# Patient Record
Sex: MALE | Race: WHITE
[De-identification: names, ages, dates, MRNs, and addresses within clinical notes are randomized per-mention and may not be internally consistent; named-entity substitution may affect disease eponyms.]

---

## 2020-02-12 ENCOUNTER — HOSPITAL ENCOUNTER (EMERGENCY)
Dept: HOSPITAL 41 - JD.ED | Age: 65
Discharge: HOME | End: 2020-02-12
Payer: COMMERCIAL

## 2020-02-12 DIAGNOSIS — Z96.653: ICD-10-CM

## 2020-02-12 DIAGNOSIS — R42: Primary | ICD-10-CM

## 2020-02-12 DIAGNOSIS — Z88.5: ICD-10-CM

## 2020-02-12 DIAGNOSIS — Z88.8: ICD-10-CM
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
## 2020-02-12 DIAGNOSIS — Z79.82: ICD-10-CM

## 2020-02-12 DIAGNOSIS — M25.562: ICD-10-CM

## 2020-02-12 PROCEDURE — 36415 COLL VENOUS BLD VENIPUNCTURE: CPT

## 2020-02-12 PROCEDURE — 85025 COMPLETE CBC W/AUTO DIFF WBC: CPT

## 2020-02-12 PROCEDURE — 99284 EMERGENCY DEPT VISIT MOD MDM: CPT

## 2020-02-12 PROCEDURE — 73564 X-RAY EXAM KNEE 4 OR MORE: CPT

## 2020-02-12 PROCEDURE — 83735 ASSAY OF MAGNESIUM: CPT

## 2020-02-12 PROCEDURE — 80053 COMPREHEN METABOLIC PANEL: CPT

## 2020-02-12 NOTE — CR
Left knee: Four views of the left knee were obtained.

 

Comparison: Prior left knee exam of 11/11/11.

 

Knee prosthesis is seen.  Components are aligned.  Underlying bony 

structures are intact.  No acute fracture or dislocation is seen.  

Joint effusion is noted.

 

Impression:

1.  Left knee prosthesis.

2.  No acute fracture is appreciated.

3.  Joint effusion within the knee.

 

Diagnostic code #3

 

This report was dictated in [place] Standard Time

## 2020-02-12 NOTE — EDM.PDOC
<Smitha Vargas - Last Filed: 02/12/20 09:39>





ED HPI GENERAL MEDICAL PROBLEM





- General


Chief Complaint: Neuro Symptoms/Deficits


Stated Complaint: DIZZY AND LT LEG PAIN


Time Seen by Provider: 02/12/20 09:14


Source of Information: Reports: Patient, Significant Other


History Limitations: Reports: No Limitations





- History of Present Illness


INITIAL COMMENTS - FREE TEXT/NARRATIVE: 





Patient is a pleasant 64-year-old male with a history of chronic pain and a 

pain pump implanted on his right side presents to the ED today for dizziness 

that has been present for the past five weeks. He states he feels like he is 

spinning and the room is still. This has caused him to be nauseous at times to 

the point where he has almost vomited. 





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 

hernia repairs.  His wife reports that he goes in to get the pump refilled 

every three months and the dose is increased by 7% each time she believes. It 

was last refilled January 2nd, 2020. Roughly four days after it was filled the 

patient developed dizziness.  He went to the [facility] to be evaluated 

for the dizziness and left hand and facial numbness.  The [facility] 

told him to take Dramamine and go to his PCP.  The Dramamine did not help the 

dizziness.  The next day he got in to see Dr. Mendes, his PCP, who worked him 

up for a stroke, including a CT scan of his head, a carotid US, and an 

echocardiogram, which were all negative.  Dr. Mendes diagnosed him with 

Benign Paroxysmal Positional Vertigo and sent him to PT for evaluation and 

treatment.  He has seen PT a couple times and has had the Epley maneuvers done 

with no relief from the dizziness.  He has also gone to his chiropractor for 

manipulations of his neck with no relief from the dizziness either.  His wife 

recently started giving him Benadryl per Dr. Mendes recommendation and this 

has not helped either.  





He also reports left knee pain that started after he fell about two months ago.

  He has bilateral knee replacements.  He is worried that he may have damaged 

his left artificial knee when he fell.  He cannot remember how he fell or what 

part of his body he hit.  The pain increases with weight-bearing activities.  

He also reports he has headaches intermittently throughout the past month.  He 

has a headache currently and rates it a 5/10 located on the top of his head. 


Duration: Week(s):, Constant


  ** Left Knee


Pain Score (Numeric/FACES): 8





- Related Data


 Allergies











Allergy/AdvReac Type Severity Reaction Status Date / Time


 


carbamazepine [From Tegretol] Allergy  Liver Verified 02/12/20 08:46





   Problems  


 


morphine Allergy  Itching Verified 02/12/20 08:46


 


oxycodone Allergy  Itching Verified 02/12/20 08:46











Home Meds: 


 Home Meds





Aspirin [Halfprin] 81 mg PO Q48H 02/12/20 [History]


Carbidopa/Levodopa [Carbidopa-Levo  MG ODT] 2 tab PO BEDTIME 02/12/20 [

History]


Cyclobenzaprine [Flexeril] 10 mg PO BID 02/12/20 [History]


Diclofenac Sodium [Voltaren 1% Gel] 2 gram TOP QID 02/12/20 [History]


FLUoxetine HCl [Fluoxetine HCl] 60 mg PO DAILY 02/12/20 [History]


Fish Oil/Omega-3 Fatty Acids [Fish Oil 1,000 MG] 1,000 mg PO DAILY 02/12/20 [

History]


Gabapentin [Neurontin] 600 mg PO TID 02/12/20 [History]


HYDROmorphone [Dilaudid] 4 mg PO Q4H PRN 02/12/20 [History]


Multivitamin [Multivitamins] 1 each PO DAILY 02/12/20 [History]


Pravastatin [Pravachol] 40 mg PO DAILY 02/12/20 [History]


Tamsulosin [Flomax] 0.4 mg PO DAILY 02/12/20 [History]











Past Medical History





- Past Surgical History


GI Surgical History: Reports: Hernia, Abdominal


Musculoskeletal Surgical History: Reports: Knee Replacement





Social & Family History





- Tobacco Use


Smoking Status *Q: Never Smoker





- Caffeine Use


Caffeine Use: Reports: Coffee





- Recreational Drug Use


Recreational Drug Use: No





ED ROS GENERAL





- Review of Systems


Review Of Systems: See Below


Constitutional: Reports: No Symptoms.  Denies: Fever, Chills, Weakness, 

Decreased Appetite


HEENT: Reports: Vertigo.  Denies: Ear Pain, Eye Pain, Throat Pain


Respiratory: Reports: No Symptoms.  Denies: Shortness of Breath, Wheezing, Cough


Cardiovascular: Reports: No Symptoms.  Denies: Chest Pain, Edema, 

Lightheadedness, Syncope


GI/Abdominal: Reports: No Symptoms.  Denies: Abdominal Pain, Diarrhea, Nausea, 

Vomiting


: Reports: No Symptoms.  Denies: Dysuria


Musculoskeletal: Reports: Joint Pain (left knee), Joint Swelling (left knee).  

Denies: Neck Pain, Back Pain


Skin: Reports: No Symptoms.  Denies: Rash, Erythema


Neurological: Reports: Dizziness, Headache.  Denies: Numbness, Syncope, Tingling

, Weakness


Psychiatric: Reports: No Symptoms





ED EXAM, NEURO





- Physical Exam


Exam: See Below


Exam Limited By: No Limitations


General Appearance: Alert, WD/WN, No Apparent Distress


Eye Exam: Bilateral Eye: Nystagmus (with lateral eye movements), PERRL


Ears: Normal External Exam, Normal Canal, Hearing Grossly Normal, Normal TMs


Nose: Normal Inspection, Normal Mucosa, No Blood


Throat/Mouth: Normal Inspection, Normal Lips, Normal Teeth, Normal Gums, Normal 

Oropharynx, Normal Voice, No Airway Compromise


Head Exam: Atraumatic, Normocephalic


Neck: Normal Inspection, Supple, Non-Tender, Full Range of Motion


Respiratory/Chest: No Respiratory Distress, Lungs Clear, Normal Breath Sounds, 

No Accessory Muscle Use, Chest Non-Tender


Cardiovascular: Normal Peripheral Pulses, Regular Rate, Rhythm, No Edema, No 

Gallop, No Murmur


GI/Abdominal: Normal Bowel Sounds, Soft, No Organomegaly, No Distention, No Mass

, Tender (left quadrants from multiple hernia repairs and nerve entrapment in 

mesh)


Neurological: Alert, Normal Mood/Affect, Normal Dorsiflexion, CN II-XII Intact, 

Normal Plantar Flexion, No Motor/Sensory Deficits, Oriented x 3


Back Exam: Normal Inspection, Full Range of Motion (for patient's ability)


Extremities: No Pedal Edema, Normal Capillary Refill, Joint Swelling (mild left 

knee), Limited Range of Motion (left knee due to pain)


Psychiatric: Normal Affect, Normal Mood


Skin Exam: Warm, Dry, Intact, Normal Color, No Rash





Course





- Vital Signs


Last Recorded V/S: 





 Last Vital Signs











Temp  96.7 F L  02/12/20 08:41


 


Pulse  79   02/12/20 08:41


 


Resp  10 L  02/12/20 08:41


 


BP  123/78   02/12/20 08:41


 


Pulse Ox  78 L  02/12/20 11:26














- Orders/Labs/Meds


Orders: 





 Active Orders 24 hr











 Category Date Time Status


 


 Cardiac Monitoring [RC] .AS DIRECTED Care  02/12/20 09:53 Active


 


 Oxygen Therapy [RC] ASDIRECTED Care  02/12/20 11:26 Active











Labs: 





 Laboratory Tests











  02/12/20 02/12/20 Range/Units





  10:35 10:35 


 


WBC  5.27   (4.23-9.07)  K/mm3


 


RBC  5.33   (4.63-6.08)  M/mm3


 


Hgb  13.4 L   (13.7-17.5)  gm/dl


 


Hct  42.5   (40.1-51.0)  %


 


MCV  79.7   (79.0-92.2)  fl


 


MCH  25.1 L   (25.7-32.2)  pg


 


MCHC  31.5 L   (32.2-35.5)  g/dl


 


RDW Std Deviation  66.2 H   (35.1-43.9)  fL


 


Plt Count  187   (163-337)  K/mm3


 


MPV  9.0 L   (9.4-12.3)  fl


 


Neut % (Auto)  65.8   (34.0-67.9)  %


 


Lymph % (Auto)  22.8   (21.8-53.1)  %


 


Mono % (Auto)  7.4   (5.3-12.2)  %


 


Eos % (Auto)  3.4   (0.8-7.0)  


 


Baso % (Auto)  0.6   (0.1-1.2)  %


 


Neut # (Auto)  3.47   (1.78-5.38)  K/mm3


 


Lymph # (Auto)  1.20 L   (1.32-3.57)  K/mm3


 


Mono # (Auto)  0.39   (0.30-0.82)  K/mm3


 


Eos # (Auto)  0.18   (0.04-0.54)  K/mm3


 


Baso # (Auto)  0.03   (0.01-0.08)  K/mm3


 


Sodium   140  (136-145)  mEq/L


 


Potassium   4.5  (3.5-5.1)  mEq/L


 


Chloride   103  ()  mEq/L


 


Carbon Dioxide   30  (21-32)  mEq/L


 


Anion Gap   11.5  (5-15)  


 


BUN   16  (7-18)  mg/dL


 


Creatinine   0.7  (0.7-1.3)  mg/dL


 


Est Cr Clr Drug Dosing   94.47  mL/min


 


Estimated GFR (MDRD)   > 60  (>60)  mL/min


 


BUN/Creatinine Ratio   22.9 H  (14-18)  


 


Glucose   99  ()  mg/dL


 


Calcium   9.0  (8.5-10.1)  mg/dL


 


Magnesium   1.8  (1.8-2.4)  mg/dl


 


Total Bilirubin   0.7  (0.2-1.0)  mg/dL


 


AST   20  (15-37)  U/L


 


ALT   29  (16-63)  U/L


 


Alkaline Phosphatase   112  ()  U/L


 


Total Protein   6.6  (6.4-8.2)  g/dl


 


Albumin   3.5  (3.4-5.0)  g/dl


 


Globulin   3.1  gm/dL


 


Albumin/Globulin Ratio   1.1  (1-2)  











Meds: 





Medications














Discontinued Medications














Generic Name Dose Route Start Last Admin





  Trade Name Freq  PRN Reason Stop Dose Admin


 


Diazepam  2 mg  02/12/20 09:53  02/12/20 10:29





  Valium  PO  02/12/20 09:54  2 mg





  ONETIME ONE   Administration





     





     





     





     














Departure





- Departure


Disposition: Home, Self-Care 01


Clinical Impression: 


 Vertigo








- Discharge Information


Referrals: 


Yohannes Mendes MD [Primary Care Provider] - 1 Week


Forms:  ED Department Discharge


Additional Instructions: 


Take your medication as prescribed.  Call Dinah Naylor and let her know what is 

going on.  I will call you if she calls me back.  Follow up with Dr Lewis an 

audiologist at [facility] tomorrow at 1:30.  Please come 

early to register.  Please return if you are worse.





Sepsis Event Note





- Evaluation


Sepsis Screening Result: No Definite Risk





- Focused Exam


Vital Signs: 





 Vital Signs











  Temp Pulse Resp BP Pulse Ox Pulse Ox


 


 02/12/20 11:26       78 L


 


 02/12/20 08:41  96.7 F L  79  10 L  123/78  86 L 











Date Exam was Performed: 02/12/20


Time Exam was Performed: 09:39





- My Orders


Last 24 Hours: 





My Active Orders





02/12/20 09:53


Cardiac Monitoring [RC] .AS DIRECTED 





02/12/20 11:26


Oxygen Therapy [RC] ASDIRECTED 














- Assessment/Plan


Last 24 Hours: 





My Active Orders





02/12/20 09:53


Cardiac Monitoring [RC] .AS DIRECTED 





02/12/20 11:26


Oxygen Therapy [RC] ASDIRECTED 














<[facility] - Last Filed: 02/12/20 13:01>





Course





- Re-Assessments/Exams


Free Text/Narrative Re-Assessment/Exam: 





02/12/20 12:54


I examined the patient myself and I agree with Smitha's assessment and plan.  I 

ordered labs and some valium 2mg.  He did not do good with the valium.  It 

slowed down his respirations.  His CBC and CMP look good.  The patient was 

worked up by Dr Mendes for a stroke.  He had a CT, carotid US and echo.  All 

that looked good.  He called his pain specialist and they advised him to go to 

the ER.  He also went to PT.  He has been using antivert and benadryl and 

nothing is working.  I tried calling RAMIN Milton his pain specialist but I 

had to leave a message.  I did make an appointment for him to see Dr Lewis at 

[facility].  Dr Lewis does vertigo assessments.


02/12/20 12:59


The x-ray of his knee shows arthritis and joint prosthesis with no acute 

changes.





Departure





- Departure


Time of Disposition: 13:00


Condition: Good





- Discharge Information


*PRESCRIPTION DRUG MONITORING PROGRAM REVIEWED*: Not Applicable


*COPY OF PRESCRIPTION DRUG MONITORING REPORT IN PATIENT EFRAÍN: Not Applicable





Sepsis Event Note





- Focused Exam


Date Exam was Performed: 02/12/20


Time Exam was Performed: 12:54

## 2021-10-11 ENCOUNTER — HOSPITAL ENCOUNTER (EMERGENCY)
Dept: HOSPITAL 41 - JD.ED | Age: 66
Discharge: HOME | End: 2021-10-11
Payer: COMMERCIAL

## 2021-10-11 DIAGNOSIS — R07.89: Primary | ICD-10-CM

## 2021-10-11 DIAGNOSIS — Z88.5: ICD-10-CM

## 2021-10-11 DIAGNOSIS — Z88.8: ICD-10-CM

## 2021-10-11 DIAGNOSIS — Z87.891: ICD-10-CM

## 2021-10-11 PROCEDURE — 85025 COMPLETE CBC W/AUTO DIFF WBC: CPT

## 2021-10-11 PROCEDURE — 36415 COLL VENOUS BLD VENIPUNCTURE: CPT

## 2021-10-11 PROCEDURE — 99285 EMERGENCY DEPT VISIT HI MDM: CPT

## 2021-10-11 PROCEDURE — 93005 ELECTROCARDIOGRAM TRACING: CPT

## 2021-10-11 PROCEDURE — 85379 FIBRIN DEGRADATION QUANT: CPT

## 2021-10-11 PROCEDURE — 80053 COMPREHEN METABOLIC PANEL: CPT

## 2021-10-11 PROCEDURE — 71275 CT ANGIOGRAPHY CHEST: CPT

## 2021-10-11 PROCEDURE — 84484 ASSAY OF TROPONIN QUANT: CPT

## 2021-10-11 NOTE — CT
CT chest

 

Technique: Multiple axial sections through the chest were obtained.  

Intravenous contrast was utilized.  Study has been performed as a 

pulmonary angiogram protocol.

 

Comparison: No prior chest imaging is available.

 

Findings: Pulmonary arteries are moderately well opacified.  No 

filling defects are seen to indicate pulmonary emboli.  Thoracic aorta

 shows no aneurysm.  Mediastinum and hilar regions show no adenopathy 

or mass.

 

Mild coronary artery calcification is seen.  No pericardial thickening

 is seen.

 

Visualized upper abdominal structures show calcified granulomas within

 the spleen.  Fat-containing anterior abdominal hernia is noted within

 the upper abdomen.

 

Lung window settings were reviewed.  Slight atelectasis versus 

scarring is seen within the left upper lung and left lower lung.  

Lungs otherwise are clear with no acute parenchymal change.

 

Bone window settings were reviewed which show scattered disc space 

narrowing and endplate spurring within the thoracic spine.  No acute 

osseous abnormality is appreciated.

 

Impression:

1.  No findings of pulmonary embolism.

2.  Slight atelectasis or scarring within the left base as well as 

left upper lung.

3.  Mild coronary artery calcification.

4.  Other findings believed to be chronic as described above.

 

Diagnostic code #2
